# Patient Record
Sex: MALE | Race: WHITE | NOT HISPANIC OR LATINO | Employment: FULL TIME | URBAN - METROPOLITAN AREA
[De-identification: names, ages, dates, MRNs, and addresses within clinical notes are randomized per-mention and may not be internally consistent; named-entity substitution may affect disease eponyms.]

---

## 2019-10-08 ENCOUNTER — OFFICE VISIT (OUTPATIENT)
Dept: SURGERY | Facility: CLINIC | Age: 69
End: 2019-10-08
Payer: MEDICARE

## 2019-10-08 VITALS — BODY MASS INDEX: 35.03 KG/M2 | HEIGHT: 69 IN | WEIGHT: 236.5 LBS

## 2019-10-08 DIAGNOSIS — Z78.9 AT AVERAGE RISK FOR MALIGNANT NEOPLASM OF COLON: Primary | ICD-10-CM

## 2019-10-08 PROCEDURE — 99202 OFFICE O/P NEW SF 15 MIN: CPT | Mod: S$GLB,,, | Performed by: SURGERY

## 2019-10-08 PROCEDURE — 99202 PR OFFICE/OUTPT VISIT, NEW, LEVL II, 15-29 MIN: ICD-10-PCS | Mod: S$GLB,,, | Performed by: SURGERY

## 2019-10-08 RX ORDER — LISINOPRIL 20 MG/1
TABLET ORAL
COMMUNITY
Start: 2019-07-10

## 2019-10-08 RX ORDER — APIXABAN 5 MG/1
TABLET, FILM COATED ORAL
COMMUNITY
Start: 2019-10-03

## 2019-10-08 RX ORDER — AMLODIPINE BESYLATE 5 MG/1
TABLET ORAL
COMMUNITY
Start: 2019-07-10

## 2019-10-08 RX ORDER — ATORVASTATIN CALCIUM 40 MG/1
TABLET, FILM COATED ORAL
COMMUNITY
Start: 2019-07-16

## 2019-10-08 RX ORDER — CLOPIDOGREL BISULFATE 75 MG/1
TABLET ORAL
COMMUNITY
Start: 2019-10-05

## 2019-10-08 RX ORDER — METOPROLOL SUCCINATE 100 MG/1
TABLET, EXTENDED RELEASE ORAL
COMMUNITY
Start: 2019-07-18

## 2019-10-08 NOTE — PROGRESS NOTES
Subjective:       Patient ID: Alfa Ward is a 68 y.o. male.    Chief Complaint: Colonoscopy    Mr. areas referred by Dr. Bansal in [place] for screening colonoscopy.  He has not had screening in the past.  He is asymptomatic.  No family history of colorectal cancer.  He is currently on both Eliquis and Plavix. The Eliquis is for atrial fibrillation.  Plavix is for a redo left iliac stent placement done in May of this year.    Past Medical History:   Diagnosis Date    Atrial fibrillation     Hyperlipidemia     Hypertension     Peripheral vascular disease      Past Surgical History:   Procedure Laterality Date    ILIAC ARTERY STENT Left 2010    ILIAC ARTERY STENT Left 05/2019     Family History   Problem Relation Age of Onset    Colon polyps Mother     Breast cancer Mother     Heart disease Mother     Heart disease Father      Social History     Socioeconomic History    Marital status:      Spouse name: Not on file    Number of children: 2    Years of education: Not on file    Highest education level: Not on file   Occupational History    Not on file   Social Needs    Financial resource strain: Not on file    Food insecurity:     Worry: Not on file     Inability: Not on file    Transportation needs:     Medical: Not on file     Non-medical: Not on file   Tobacco Use    Smoking status: Former Smoker     Packs/day: 1.50     Years: 30.00     Pack years: 45.00     Types: Cigarettes    Smokeless tobacco: Current User     Types: Snuff   Substance and Sexual Activity    Alcohol use: Not Currently    Drug use: Not on file    Sexual activity: Not on file   Lifestyle    Physical activity:     Days per week: Not on file     Minutes per session: Not on file    Stress: Not on file   Relationships    Social connections:     Talks on phone: Not on file     Gets together: Not on file     Attends Voodoo service: Not on file     Active member of club or organization: Not on file     Attends  "meetings of clubs or organizations: Not on file     Relationship status: Not on file   Other Topics Concern    Not on file   Social History Narrative    Not on file       Current Outpatient Medications   Medication Sig Dispense Refill    amLODIPine (NORVASC) 5 MG tablet       atorvastatin (LIPITOR) 40 MG tablet       clopidogrel (PLAVIX) 75 mg tablet       ELIQUIS 5 mg Tab       lisinopril (PRINIVIL,ZESTRIL) 20 MG tablet       metoprolol succinate (TOPROL-XL) 100 MG 24 hr tablet        No current facility-administered medications for this visit.      Review of patient's allergies indicates:  No Known Allergies    Review of Systems   All other systems reviewed and are negative.      Objective:      Vitals:    10/08/19 1041   Weight: 107.3 kg (236 lb 8 oz)   Height: 5' 9" (1.753 m)     Physical Exam   Constitutional: He appears well-developed and well-nourished.   Neck: Normal range of motion. Neck supple.   Cardiovascular: Normal rate and normal heart sounds.   irregularly irregular rhythm consistent with known atrial fib       Pulmonary/Chest: Effort normal and breath sounds normal.   Abdominal: Soft. Bowel sounds are normal. He exhibits no mass.   Musculoskeletal: Normal range of motion. He exhibits no deformity.       Assessment:       Average risk for colorectal cancer  Atrial fibrillation requiring chronic anticoagulation  Recent left iliac stent placement requiring ongoing antiplatelet therapy, specifically Plavix  Plan:       Will check with Dr. Swanson regarding when he can safely be off Plavix.  Patient will be notified of that and then can be scheduled accordingly.      "

## 2019-10-08 NOTE — LETTER
October 8, 2019      Dylan Bansal MD  [address] [facility] [address]           [facility]  [address]  [place] [address]  Phone: [phone]  Fax: [phone]          Patient: Alfa Ward   MR Number: [id number]   YOB: 1950   Date of Visit: 10/8/2019       Dear Dr. Dylan Bansal:    Thank you for referring Alfa Ward to me for evaluation. Attached you will find relevant portions of my assessment and plan of care.    If you have questions, please do not hesitate to call me. I look forward to following Alfa Ward along with you.    Sincerely,    Jose Frausto MD    Enclosure  CC:  No Recipients    If you would like to receive this communication electronically, please contact [email or web address] or [phone] to request more information on [employer] Link access.    For providers and/or their staff who would like to refer a patient to [facility], please contact us through our one-stop-shop provider referral line, [facility] , at [phone].    If you feel you have received this communication in error or would no longer like to receive these types of communications, please e-mail [email or web address]

## 2019-11-11 ENCOUNTER — TELEPHONE (OUTPATIENT)
Dept: SURGERY | Facility: CLINIC | Age: 69
End: 2019-11-11

## 2019-11-11 NOTE — TELEPHONE ENCOUNTER
----- Message from Magaly Duarte sent at 11/6/2019  9:57 AM CST -----  Contact: Alfa  Have you received  his clearance from Dr. Swanson? He would like to have his colonoscopy before the end of the year. [phone]

## 2019-11-11 NOTE — TELEPHONE ENCOUNTER
SPOKE WITH PT AGAIN. LET HIM KNOW DR ROWE OFFICE RECEIVED THE FAX AND THE NURSE PRACTITIONER WILL ASSESS CLEARANCE. TOLD PATIENT THAT WITH JUST HAVING A STENT PLACED IN MAY OF THIS YEAR HE MAY HAVE TO WAIT UNTIL HE SEES DR RAMAN FOR THE FOLLOW UP. I TOLD HIM I WOULD CALL HIM BACK ONCE I RECEIVED THE RELEASE OR DENIAL FOR THE PROCEDURE. AME

## 2019-11-19 DIAGNOSIS — Z12.11 SCREENING FOR MALIGNANT NEOPLASM OF COLON: Primary | ICD-10-CM
